# Patient Record
Sex: FEMALE | Race: WHITE
[De-identification: names, ages, dates, MRNs, and addresses within clinical notes are randomized per-mention and may not be internally consistent; named-entity substitution may affect disease eponyms.]

---

## 2017-07-01 ENCOUNTER — HEALTH MAINTENANCE LETTER (OUTPATIENT)
Age: 32
End: 2017-07-01

## 2018-06-13 ENCOUNTER — OFFICE VISIT - RIVER FALLS (OUTPATIENT)
Dept: FAMILY MEDICINE | Facility: CLINIC | Age: 33
End: 2018-06-13

## 2018-06-13 ASSESSMENT — MIFFLIN-ST. JEOR: SCORE: 1314.14

## 2018-06-14 LAB
CREAT SERPL-MCNC: 0.71 MG/DL (ref 0.5–1.1)
GLUCOSE BLD-MCNC: 94 MG/DL (ref 65–99)
HBA1C MFR BLD: 4.7 %

## 2019-10-04 ENCOUNTER — HEALTH MAINTENANCE LETTER (OUTPATIENT)
Age: 34
End: 2019-10-04

## 2020-11-08 ENCOUNTER — HEALTH MAINTENANCE LETTER (OUTPATIENT)
Age: 35
End: 2020-11-08

## 2021-09-11 ENCOUNTER — HEALTH MAINTENANCE LETTER (OUTPATIENT)
Age: 36
End: 2021-09-11

## 2022-01-01 ENCOUNTER — HEALTH MAINTENANCE LETTER (OUTPATIENT)
Age: 37
End: 2022-01-01

## 2022-02-11 VITALS
HEART RATE: 130 BPM | SYSTOLIC BLOOD PRESSURE: 107 MMHG | DIASTOLIC BLOOD PRESSURE: 75 MMHG | HEIGHT: 65 IN | TEMPERATURE: 98.5 F | WEIGHT: 135.2 LBS | BODY MASS INDEX: 22.53 KG/M2

## 2022-02-16 NOTE — PROGRESS NOTES
Chief Complaint    New patient presents to discuss extreme fatigue and worries about tachycardia. Has been living in [place] last 4 years.  History of Present Illness      Patient presents to clinic today with report of some feelings of fatigue and dizziness that are worse when she is standing.  It started a few months ago.  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  After this she plans to return to [place] with her .  Her symptoms started a few months ago.  She reports that she had been taking oral contraceptive pill but was having breakthrough bleeding consisting of light brown smudges of blood sometimes daily for a month.  She reports the cream doctors work this up for her and they cannot find any abnormalities they did not offer changing her birth control pills and she was told that it was related to stress.  She has quit taking her oral contraceptive pill at this time.  While in clinic today she was noted to be positive for orthostatic hypotension with her pulse laying flat at 83 and when standing going up to 13  With this increase in heart rate she is able to maintain her blood pressure.        Past medical history is negative       Past surgical history is negative       Social history she does not drink smoke or do any illicit drugs of abuse.  She reports that she eats a healthy diet and that her weight had gotten up to 150 so she decided to make some healthy lifestyle changes in his weight to weight.  Family history is positive for a maternal grandmother who had breast cancer, her mom has hypertension, her dad has hyperlipidemia, and her maternal grandmother also has kidney disease.  Review of Systems      Positive for some intentional weight loss, fatigue, some vision changes, remote history of abnormal Pap smear, review of systems is otherwise negative as noted on the adult patient health history form.  Physical Exam   Vitals &  Measurements    T: 98.5   F (Tympanic)  HR: 100(Peripheral)  BP: 112/66  BP: 111/75(Sitting)  BP: 107/75(Standing)  BP: 11/73(Supine)     HT: 65 in  WT: 135.2 lb  BMI: 22.5       Neck is supple there is no thyromegaly and no lymphadenopathy in her anterior posterior or supraclavicular lymph chains.      General: alert and oriented ×3 no acute distress.      HEENT: Normocephalic and atraumatic.       Eyes pupils are equal round and reactive to light extraocular motion is intact.       Hearing is grossly normal and there is no otorrhea. Tympanic membranes are pearly grey with a normal light reflex.      Nares are patent there is no rhinorrhea.       Mucous membranes are moist and pink.      Chest: has bilateral rise with no increased work of breathing. clear to auscultation without wheezes, rhonchi, or rales.      Cardiovascular: normal perfusion and brisk capillary refill. S1S2 with regular rate and rhythm and no murmurs, gallops or rubs.      Musculoskeletal: no gross focal abnormalities and normal gait.      Neuro: no gross focal abnormalities and memory seems intact.      Psychiatric: speech is clear and coherent and fluent. Patient dressed appropriately for the weather. Mood is appropriate and affect is full.         Assessment/Plan       Fatigue (R53.83)        I suspect that this is most likely related to orthostatic hypotension.  Also see EKG note from today which shows a normal EKG.  Would like to have patient increase her salt and water consumption, encouraged her to try compression stockings, and would like patient to return to clinic next week so we can review her labs.         Orders:          87250 ecg routine ecg w/least 12 lds w/i+r ([facility]), Quantity: 1, Fatigue          CBC (h/h, RBC, indices, WBC, Plt)* ([facility]), Specimen Type: Blood, Collection Date: 06/13/18 11:34:00 CDT          Comprehensive Metabolic Panel* ([facility]), Specimen Type: Serum, Collection Date: 06/13/18 11:34:00 CDT          Hemoglobin  A1c* ([facility]), Specimen Type: Blood, Collection Date: 06/13/18 11:34:00 CDT          TSH* ([facility]), Specimen Type: Serum, Collection Date: 06/13/18 11:34:00 CDT           Patient Information     Name:IRMA HEWITT      Address:      [address] [address]     Sex:Female     YOB: 1985     [phone]     Emergency Contact:VANCE DHAVAL M     MRN:[id number]     FIN:[id number]     Location[facility]     Date of Service:06/13/2018      Primary Care Physician:       NONE ,       Attending Physician:       Clyde DIALLO Paulette, [phone]  Problem List/Past Medical History    Ongoing     No qualifying data    Historical     No qualifying data  Medications     No Recorded Medications      Allergies    No Known Medication Allergies  Social History    Smoking Status - 06/13/2018     Never smoker

## 2022-02-16 NOTE — PROGRESS NOTES
Patient:   IRMA HEWITT            MRN: [id number]            FIN: [id number]               Age:   32 years     Sex:  Female     :  1985   Associated Diagnoses:   None   Author:   Clyde DIALLO, Paulette      Procedure   EKG procedure   Date:  2018.     Confirmed: patient.     Indication: presyncope and fatigue.     Position: supine.     EKG findings   Interpretation: by primary care provider.     Rhythm: sinus normal.     Axis: normal axis, normal configuration.     Within normal limits.     Intervals: FL normal, QRS normal, QT normal.     Normal EKG.     P waves: normal.     QRS complex: normal, no Q waves present.     ST-T-U complex: normal.     Interpretation: ST-T wave abnormalities No acute ST changes.     Discussed: with patient.        Impression and Plan   Orders

## 2022-10-30 ENCOUNTER — HEALTH MAINTENANCE LETTER (OUTPATIENT)
Age: 37
End: 2022-10-30

## 2023-04-08 ENCOUNTER — HEALTH MAINTENANCE LETTER (OUTPATIENT)
Age: 38
End: 2023-04-08